# Patient Record
Sex: FEMALE | Race: WHITE | Employment: OTHER | URBAN - METROPOLITAN AREA
[De-identification: names, ages, dates, MRNs, and addresses within clinical notes are randomized per-mention and may not be internally consistent; named-entity substitution may affect disease eponyms.]

---

## 2017-07-17 ENCOUNTER — OFFICE VISIT (OUTPATIENT)
Dept: FAMILY MEDICINE CLINIC | Age: 77
End: 2017-07-17

## 2017-07-17 VITALS
HEIGHT: 64 IN | SYSTOLIC BLOOD PRESSURE: 117 MMHG | TEMPERATURE: 96.6 F | DIASTOLIC BLOOD PRESSURE: 68 MMHG | RESPIRATION RATE: 16 BRPM | HEART RATE: 74 BPM | BODY MASS INDEX: 19.29 KG/M2 | OXYGEN SATURATION: 100 % | WEIGHT: 113 LBS

## 2017-07-17 DIAGNOSIS — S90.852A FOREIGN BODY IN FOOT, LEFT, INITIAL ENCOUNTER: Primary | ICD-10-CM

## 2017-07-17 RX ORDER — TRAZODONE HYDROCHLORIDE 50 MG/1
TABLET ORAL
Refills: 3 | COMMUNITY
Start: 2017-06-27

## 2017-07-17 RX ORDER — LOTEPREDNOL ETABONATE 5 MG/G
OINTMENT OPHTHALMIC
Refills: 2 | COMMUNITY
Start: 2017-04-18

## 2017-07-17 NOTE — PROGRESS NOTES
Rm 2  Pt presents to the clinic complaining of possible glass in her foot. Pt states she broke a glass in her kitchen a few days ago and avoided walking barefoot until yesterday when she stepped on what felt like a \"pin prick\". Pt thinks a small shard of glass in in the sole of her foot. Depression Screening Completed: yes    Learning Assessment Completed: yes    Abuse Screening Completed: yes    Health Maintenance reviewed and discussed per provider: yes     Coordination of Care:    1. Have you been to the ER, urgent care clinic since your last visit? Hospitalized since your last visit? no    2. Have you seen or consulted any other health care providers outside of the [address] [place] [address] since your last visit? Include any pap smears or colon screening.  no

## 2017-07-17 NOTE — PROGRESS NOTES
Ms. Malia Gloria is here because she has glass in her foot. She broke a glass in her kitchen a few days ago, yesterday was barefoot and stepped on something sharp. When she pushes on the area she feels a sharp pain. [facility]  OFFICE PROCEDURE PROGRESS NOTE        Chart reviewed for the following:   Mayra Alexandra MD, have reviewed the History, Physical and updated the Allergic reactions for Marcheta Scarce performed immediately prior to start of procedure:   Mayra Alexandra MD, have performed the following reviews on Saira Valerio prior to the start of the procedure:            * Patient was identified by name and date of birth   * Agreement on procedure being performed was verified  * Risks and Benefits explained to the patient  * Procedure site verified and marked as necessary  * Patient was positioned for comfort  * Consent was signed and verified     Time: 0830      Date of procedure: 7/17/2017    Procedure performed by:  Taylor Figueroa MD    Provider assisted by: Tristin Smith LPN    Patient assisted by: self    How tolerated by patient: tolerated the procedure well with no complications    Post Procedural Pain Scale: 0 - No Hurt    Comments: none    Area was prepped with Betadine, anesth with 1% lidocaine with epi. Entrance wound was opened with a 11 blade, a square shaped 6 mm piece of glass was removed with a little difficulty. Wound was dressed with sterile gauze.

## 2017-07-20 ENCOUNTER — TELEPHONE (OUTPATIENT)
Dept: FAMILY MEDICINE CLINIC | Age: 77
End: 2017-07-20

## 2017-07-20 NOTE — TELEPHONE ENCOUNTER
Patient came to the office complaining of pain in her foot and possibly having glass in her foot again. Patient was informed Dr. Jimena Fulton will refer her to the podiatrist for further evaluation. Patient has an appointment scheduled on 07/21/17 @ 1:30pm with Dr. Isra Wisdom.     Contact information     [address]  [address] [place] [address]    Tel [phone]  Fax [phone]

## 2017-07-21 NOTE — TELEPHONE ENCOUNTER
Called patient this morning to inform her of her appointment. Patient verbalized understanding and had no further questions.